# Patient Record
Sex: FEMALE | Race: BLACK OR AFRICAN AMERICAN | NOT HISPANIC OR LATINO | Employment: FULL TIME | URBAN - METROPOLITAN AREA
[De-identification: names, ages, dates, MRNs, and addresses within clinical notes are randomized per-mention and may not be internally consistent; named-entity substitution may affect disease eponyms.]

---

## 2018-08-12 ENCOUNTER — HOSPITAL ENCOUNTER (EMERGENCY)
Facility: HOSPITAL | Age: 53
Discharge: HOME | End: 2018-08-12
Attending: EMERGENCY MEDICINE
Payer: MEDICAID

## 2018-08-12 VITALS
RESPIRATION RATE: 18 BRPM | BODY MASS INDEX: 37.21 KG/M2 | TEMPERATURE: 98 F | HEIGHT: 63 IN | DIASTOLIC BLOOD PRESSURE: 84 MMHG | WEIGHT: 210 LBS | OXYGEN SATURATION: 96 % | SYSTOLIC BLOOD PRESSURE: 177 MMHG | HEART RATE: 88 BPM

## 2018-08-12 DIAGNOSIS — R51.9 FRONTAL HEADACHE: Primary | ICD-10-CM

## 2018-08-12 DIAGNOSIS — I10 ESSENTIAL HYPERTENSION: ICD-10-CM

## 2018-08-12 PROCEDURE — 25000003 PHARM REV CODE 250: Performed by: NURSE PRACTITIONER

## 2018-08-12 PROCEDURE — 99284 EMERGENCY DEPT VISIT MOD MDM: CPT

## 2018-08-12 RX ORDER — ONDANSETRON 8 MG/1
8 TABLET, ORALLY DISINTEGRATING ORAL
Status: COMPLETED | OUTPATIENT
Start: 2018-08-12 | End: 2018-08-12

## 2018-08-12 RX ORDER — HYDROCODONE BITARTRATE AND ACETAMINOPHEN 10; 325 MG/1; MG/1
1 TABLET ORAL
Status: COMPLETED | OUTPATIENT
Start: 2018-08-12 | End: 2018-08-12

## 2018-08-12 RX ORDER — AMLODIPINE BESYLATE 10 MG/1
10 TABLET ORAL DAILY
Qty: 30 TABLET | Refills: 0 | Status: SHIPPED | OUTPATIENT
Start: 2018-08-12 | End: 2020-03-16 | Stop reason: ALTCHOICE

## 2018-08-12 RX ADMIN — HYDROCODONE BITARTRATE AND ACETAMINOPHEN 1 TABLET: 10; 325 TABLET ORAL at 01:08

## 2018-08-12 RX ADMIN — ONDANSETRON 8 MG: 8 TABLET, ORALLY DISINTEGRATING ORAL at 01:08

## 2018-08-12 NOTE — ED PROVIDER NOTES
Encounter Date: 8/12/2018       History     Chief Complaint   Patient presents with    Hypertension     reports reading in 190's on friday. reports not on meds     53 year old female with complaint of headache and elevated BP over the past few weeks.  Reports elevated BP last week also.  No neck pain. Reports that she used to take BP medications 3 years ago but stopped taking mediation. Reports that she had a severe headache yesterday.  Denies history of headaches.           Review of patient's allergies indicates:  No Known Allergies  Past Medical History:   Diagnosis Date    Hypertension      Past Surgical History:   Procedure Laterality Date    CHOLECYSTECTOMY      HERNIA REPAIR       History reviewed. No pertinent family history.  Social History     Tobacco Use    Smoking status: Current Every Day Smoker    Smokeless tobacco: Never Used   Substance Use Topics    Alcohol use: No     Frequency: Never    Drug use: Not on file     Review of Systems   Constitutional: Negative for fever.   HENT: Negative for sore throat.    Respiratory: Negative for shortness of breath.    Cardiovascular: Negative for chest pain.   Gastrointestinal: Negative for nausea.   Genitourinary: Negative for dysuria.   Musculoskeletal: Negative for back pain.   Skin: Negative for rash.   Neurological: Positive for headaches. Negative for weakness.   Hematological: Does not bruise/bleed easily.       Physical Exam     Initial Vitals [08/12/18 1323]   BP Pulse Resp Temp SpO2   (!) 177/84 88 18 98.2 °F (36.8 °C) 96 %      MAP       --         Physical Exam    Nursing note and vitals reviewed.  Constitutional: She appears well-developed and well-nourished.   HENT:   Head: Normocephalic and atraumatic.   Eyes: Conjunctivae and EOM are normal. Pupils are equal, round, and reactive to light.   Neck: Normal range of motion. Neck supple.   Cardiovascular: Normal rate, regular rhythm, normal heart sounds and intact distal pulses.    Pulmonary/Chest: Breath sounds normal.   Abdominal: Soft. There is no tenderness. There is no rebound and no guarding.   Musculoskeletal: Normal range of motion.   Neurological: She is alert and oriented to person, place, and time. She has normal strength and normal reflexes.   Skin: Skin is warm and dry.   Psychiatric: She has a normal mood and affect. Her behavior is normal. Thought content normal.         ED Course   Procedures  Labs Reviewed - No data to display       Imaging Results          CT Head Without Contrast (Final result)  Result time 08/12/18 14:56:09    Final result by Barrie Padron MD (08/12/18 14:56:09)                 Impression:      Negative    All CT scans at this facility use dose modulation, iterative reconstruction, and/or weight based dosing when appropriate to reduce radiation dose to as low as reasonably achievable.      Electronically signed by: Barrie Padron MD  Date:    08/12/2018  Time:    14:56             Narrative:    EXAMINATION:  CT HEAD WITHOUT CONTRAST    CLINICAL HISTORY:  headache;    TECHNIQUE:  Standard non contrast CT scan of the brain.    COMPARISON:  None    FINDINGS:  The ventricles are nondilated. Gray and white matter structures reveal normal attenuation. No hemorrhage, mass effect or edema is identified.    The cranium is intact.  Visualized paranasal sinuses and mastoid air cells are clear.                                  Imaging Results          CT Head Without Contrast (Final result)  Result time 08/12/18 14:56:09    Final result by Barrie Padron MD (08/12/18 14:56:09)                 Impression:      Negative    All CT scans at this facility use dose modulation, iterative reconstruction, and/or weight based dosing when appropriate to reduce radiation dose to as low as reasonably achievable.      Electronically signed by: Barrie Padron MD  Date:    08/12/2018  Time:    14:56             Narrative:    EXAMINATION:  CT HEAD WITHOUT CONTRAST    CLINICAL  HISTORY:  headache;    TECHNIQUE:  Standard non contrast CT scan of the brain.    COMPARISON:  None    FINDINGS:  The ventricles are nondilated. Gray and white matter structures reveal normal attenuation. No hemorrhage, mass effect or edema is identified.    The cranium is intact.  Visualized paranasal sinuses and mastoid air cells are clear.                                                     Clinical Impression:   The primary encounter diagnosis was Frontal headache. A diagnosis of Essential hypertension was also pertinent to this visit.                             Toby Varela NP  08/12/18 1516       Toby Varela NP  08/12/18 1516

## 2018-09-07 LAB
BCS RECOMMENDATION EXT: NORMAL
CHOLEST SERPL-MSCNC: 247 MG/DL (ref 0–200)
HDLC SERPL-MCNC: 69 MG/DL (ref 35–70)
LDLC SERPL CALC-MCNC: 159 MG/DL (ref 0–160)
TRIGL SERPL-MCNC: 97 MG/DL (ref 40–160)

## 2018-11-10 ENCOUNTER — HOSPITAL ENCOUNTER (EMERGENCY)
Facility: HOSPITAL | Age: 53
Discharge: HOME | End: 2018-11-10
Attending: EMERGENCY MEDICINE
Payer: MEDICAID

## 2018-11-10 VITALS
RESPIRATION RATE: 20 BRPM | TEMPERATURE: 99 F | DIASTOLIC BLOOD PRESSURE: 76 MMHG | HEART RATE: 66 BPM | OXYGEN SATURATION: 100 % | SYSTOLIC BLOOD PRESSURE: 151 MMHG
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
## 2018-11-10 DIAGNOSIS — I10 HYPERTENSION, UNSPECIFIED TYPE: ICD-10-CM

## 2018-11-10 DIAGNOSIS — F17.200 SMOKER: ICD-10-CM

## 2018-11-10 DIAGNOSIS — S00.03XA CONTUSION OF SCALP, INITIAL ENCOUNTER: Primary | ICD-10-CM

## 2018-11-10 PROCEDURE — 63600175 PHARM REV CODE 636 W HCPCS: Performed by: PHYSICIAN ASSISTANT

## 2018-11-10 PROCEDURE — 99284 EMERGENCY DEPT VISIT MOD MDM: CPT | Mod: 25

## 2018-11-10 PROCEDURE — 96372 THER/PROPH/DIAG INJ SC/IM: CPT

## 2018-11-10 RX ORDER — MORPHINE SULFATE 4 MG/ML
4 INJECTION, SOLUTION INTRAMUSCULAR; INTRAVENOUS
Status: COMPLETED | OUTPATIENT
Start: 2018-11-10 | End: 2018-11-10

## 2018-11-10 RX ORDER — HYDROCODONE BITARTRATE AND ACETAMINOPHEN 5; 325 MG/1; MG/1
1 TABLET ORAL EVERY 4 HOURS PRN
Qty: 12 TABLET | Refills: 0 | Status: SHIPPED | OUTPATIENT
Start: 2018-11-10 | End: 2020-03-16

## 2018-11-10 RX ORDER — PROMETHAZINE HYDROCHLORIDE 25 MG/ML
25 INJECTION, SOLUTION INTRAMUSCULAR; INTRAVENOUS
Status: COMPLETED | OUTPATIENT
Start: 2018-11-10 | End: 2018-11-10

## 2018-11-10 RX ADMIN — PROMETHAZINE HYDROCHLORIDE 25 MG: 25 INJECTION INTRAMUSCULAR; INTRAVENOUS at 11:11

## 2018-11-10 RX ADMIN — MORPHINE SULFATE 4 MG: 4 INJECTION INTRAVENOUS at 11:11

## 2018-11-10 NOTE — ED PROVIDER NOTES
Encounter Date: 11/10/2018       History     Chief Complaint   Patient presents with    Fall     fell off chair and hit head     Pt fell from chair and struck her head on a tiled floor.      The history is provided by the patient.   Head Injury    The incident occurred just prior to arrival. She came to the ER via walk-in. The injury mechanism was a fall. There was no loss of consciousness. There was no blood loss. The quality of the pain is described as throbbing. The pain is at a severity of 6/10. The pain has been constant since the injury. Pertinent negatives include no numbness, no blurred vision, no vomiting, no tinnitus, no disorientation, no weakness and no memory loss.     Review of patient's allergies indicates:  No Known Allergies  Past Medical History:   Diagnosis Date    Hypertension      Past Surgical History:   Procedure Laterality Date    CHOLECYSTECTOMY      HERNIA REPAIR       No family history on file.  Social History     Tobacco Use    Smoking status: Current Every Day Smoker    Smokeless tobacco: Never Used   Substance Use Topics    Alcohol use: No     Frequency: Never    Drug use: Not on file     Review of Systems   Constitutional: Negative for chills and fever.   HENT: Negative for sore throat and tinnitus.    Eyes: Negative for blurred vision, pain and redness.   Respiratory: Negative for cough and shortness of breath.    Cardiovascular: Negative for chest pain.   Gastrointestinal: Negative for abdominal pain, diarrhea, nausea and vomiting.   Endocrine: Negative for polydipsia and polyphagia.   Genitourinary: Negative for dysuria.   Musculoskeletal: Negative for arthralgias, back pain and myalgias.   Skin: Negative for rash.   Neurological: Positive for headaches. Negative for weakness and numbness.   Hematological: Does not bruise/bleed easily.   Psychiatric/Behavioral: Negative for memory loss. The patient is not nervous/anxious.    All other systems reviewed and are  negative.      Physical Exam     Initial Vitals [11/10/18 0947]   BP Pulse Resp Temp SpO2   (!) 204/87 80 (!) 22 98.5 °F (36.9 °C) 99 %      MAP       --         Physical Exam    Nursing note and vitals reviewed.  Constitutional: Vital signs are normal. She appears well-developed and well-nourished. No distress.   HENT:   Head: Normocephalic and atraumatic.   Right Ear: External ear normal.   Left Ear: External ear normal.   Nose: Nose normal.   Mouth/Throat: Oropharynx is clear and moist.   Eyes: Conjunctivae, EOM and lids are normal. Pupils are equal, round, and reactive to light.   Neck: Normal range of motion and full passive range of motion without pain. Neck supple.   Cardiovascular: Normal rate, regular rhythm, S1 normal, S2 normal, normal heart sounds, intact distal pulses and normal pulses.   Pulmonary/Chest: Breath sounds normal. No respiratory distress. She has no wheezes. She has no rales.   Abdominal: Soft. Normal appearance and bowel sounds are normal. She exhibits no distension. There is no tenderness.   Musculoskeletal: Normal range of motion.   Lymphadenopathy:     She has no cervical adenopathy.   Neurological: She is alert and oriented to person, place, and time. She has normal strength. No cranial nerve deficit or sensory deficit. Coordination and gait normal.   Skin: Skin is warm, dry and intact.   Psychiatric: She has a normal mood and affect. Her speech is normal and behavior is normal. Judgment and thought content normal. Cognition and memory are normal.         ED Course   Procedures  Labs Reviewed - No data to display       Imaging Results          CT Head Without Contrast (Final result)  Result time 11/10/18 10:27:13    Final result by Abdulkadir Lyle MD (Timothy) (11/10/18 10:27:13)                 Impression:      Normal study.    All CT scans at this facility use dose modulation, iterative reconstructions, and/or weight base dosing when appropriate to reduce radiation dose to as low as  reasonably achievable.      Electronically signed by: Abdulkadir Lyle MD  Date:    11/10/2018  Time:    10:27             Narrative:    EXAMINATION:  CT HEAD WITHOUT CONTRAST    CLINICAL HISTORY:  Head trauma, minor, GCS>=13, NOC/NEXUS/CCR neg, first study;Head trauma, headache;    COMPARISON:  08/12/2018.    FINDINGS:  Motion artifact.  No intracranial acute hemorrhage or acute focal brain parenchymal abnormality is identified.  Calvarium is intact.  No change.                                    Additional MDM:   Hypertension: The patient has hypertension (no treatment required at this time). The patient's condition was felt to be stable.             Pre-hypertension/Hypertension: The pt has been informed that they may have pre-hypertension or hypertension based on a blood pressure reading in the ED. I recommend that the pt call the PCP listed on their discharge instructions or a physician of their choice this week to arrange f/u for further evaluation of possible pre-hypertension or hypertension.           Clinical Impression:   The primary encounter diagnosis was Contusion of scalp, initial encounter. Diagnoses of Hypertension, unspecified type, Fall, initial encounter, and Smoker were also pertinent to this visit.      Disposition:   Disposition: Discharged  Condition: Stable                        [facility]  11/10/18 1053       [facility]  11/10/18 1054

## 2019-01-23 LAB
HEP A AB,TOTAL: NEGATIVE
HEP B SURFACE AG: NEGATIVE
HEP C VIRUS AB: NEGATIVE
HEPATITIS B CORE IGM ANTIBODY: NEGATIVE

## 2020-03-15 PROBLEM — I10 ESSENTIAL HYPERTENSION: Status: ACTIVE | Noted: 2017-09-12

## 2020-03-15 PROBLEM — E66.9 OBESITY: Status: ACTIVE | Noted: 2020-03-15

## 2020-03-15 PROBLEM — F14.11 HISTORY OF COCAINE ABUSE: Status: ACTIVE | Noted: 2019-03-26

## 2020-03-15 PROBLEM — E78.5 DYSLIPIDEMIA: Status: ACTIVE | Noted: 2020-03-15

## 2020-03-16 PROBLEM — E66.9 OBESITY: Status: RESOLVED | Noted: 2020-03-15 | Resolved: 2020-03-16

## 2020-07-09 PROBLEM — Z86.010 HISTORY OF ADENOMATOUS POLYP OF COLON: Status: ACTIVE | Noted: 2020-07-09

## 2020-07-09 PROBLEM — Z86.010 HISTORY OF COLON POLYPS: Status: ACTIVE | Noted: 2020-07-09

## 2021-04-28 DIAGNOSIS — Z12.31 OTHER SCREENING MAMMOGRAM: ICD-10-CM

## 2021-05-12 ENCOUNTER — PATIENT MESSAGE (OUTPATIENT)
Dept: RESEARCH | Facility: HOSPITAL | Age: 56
End: 2021-05-12

## 2021-08-11 ENCOUNTER — PATIENT OUTREACH (OUTPATIENT)
Dept: ADMINISTRATIVE | Facility: HOSPITAL | Age: 56
End: 2021-08-11

## 2021-12-08 ENCOUNTER — PATIENT OUTREACH (OUTPATIENT)
Dept: ADMINISTRATIVE | Facility: HOSPITAL | Age: 56
End: 2021-12-08
Payer: MEDICAID

## 2022-06-30 LAB — CRC RECOMMENDATION EXT: NORMAL

## 2022-07-17 PROBLEM — M25.561 CHRONIC PAIN OF RIGHT KNEE: Status: ACTIVE | Noted: 2022-07-17

## 2022-07-17 PROBLEM — M19.90 ARTHRITIS: Status: ACTIVE | Noted: 2022-07-17

## 2022-07-17 PROBLEM — G89.29 CHRONIC PAIN OF RIGHT KNEE: Status: ACTIVE | Noted: 2022-07-17

## 2022-08-23 ENCOUNTER — PATIENT OUTREACH (OUTPATIENT)
Dept: ADMINISTRATIVE | Facility: HOSPITAL | Age: 57
End: 2022-08-23
Payer: MEDICAID

## 2022-08-23 NOTE — PROGRESS NOTES
There are no preventive care reminders to display for this patient.   Contacted pt regarding BP. Pt doesn't monitor BP at home but are interested in signing up with Hypertension [facility]. Message sent to doctor and note placed on up coming appt.

## 2022-12-06 PROBLEM — M17.11 PRIMARY OSTEOARTHRITIS OF RIGHT KNEE: Status: ACTIVE | Noted: 2022-12-06

## 2022-12-17 PROBLEM — I26.99 ACUTE PULMONARY EMBOLISM: Status: ACTIVE | Noted: 2022-12-17

## 2022-12-17 PROBLEM — I50.9 ACUTE HF (HEART FAILURE): Status: ACTIVE | Noted: 2022-12-17

## 2022-12-17 PROBLEM — R06.09 EXERTIONAL DYSPNEA: Status: ACTIVE | Noted: 2022-12-17

## 2022-12-17 PROBLEM — R79.89 ELEVATED TROPONIN LEVEL: Status: ACTIVE | Noted: 2022-12-17

## 2022-12-19 PROBLEM — I50.20 HFREF (HEART FAILURE WITH REDUCED EJECTION FRACTION): Status: ACTIVE | Noted: 2022-12-19

## 2022-12-19 PROBLEM — R94.39 ABNORMAL FINDING ON CARDIOVASCULAR STRESS TEST: Status: ACTIVE | Noted: 2022-12-19

## 2022-12-20 PROBLEM — R78.81 POSITIVE BLOOD CULTURES: Status: ACTIVE | Noted: 2022-12-20

## 2022-12-22 PROBLEM — R78.81 POSITIVE BLOOD CULTURES: Status: RESOLVED | Noted: 2022-12-20 | Resolved: 2022-12-22

## 2022-12-22 PROBLEM — I26.99 ACUTE PULMONARY EMBOLISM: Status: RESOLVED | Noted: 2022-12-17 | Resolved: 2022-12-22

## 2022-12-23 ENCOUNTER — PATIENT OUTREACH (OUTPATIENT)
Dept: ADMINISTRATIVE | Facility: CLINIC | Age: 57
End: 2022-12-23

## 2022-12-23 NOTE — PROGRESS NOTES
C3 nurse spoke with Luli Rogers  for a TCC post hospital discharge follow up call. The patient has a scheduled HOSFU appointment with Guicho Rice MD  on 01/03/2023 @ 8AM.

## 2023-01-23 PROBLEM — Z86.19 HISTORY OF SYPHILIS: Status: ACTIVE | Noted: 2023-01-23

## 2023-02-10 ENCOUNTER — PATIENT OUTREACH (OUTPATIENT)
Dept: ADMINISTRATIVE | Facility: OTHER | Age: 58
End: 2023-02-10

## 2023-02-10 NOTE — PROGRESS NOTES
CHW - Initial Contact    This Community Health Worker completed the Social Determinant of Health questionnaire with patient via telephone today.      Pt identified barriers of most importance are: Home Health Pt stated she wants home health services     Referrals to community agencies completed with patient/caregiver consent outside of [facility] include: none    Referrals were put through [facility] - none    Support and Services: CHW sent in-basket message to Pt's PCP  about Pt's request for home health    Other information discussed the patient needs / wants help with: none    Follow up required: yes    Follow-up Outreach - Due: 2/17/2023

## 2023-02-22 ENCOUNTER — PATIENT OUTREACH (OUTPATIENT)
Dept: ADMINISTRATIVE | Facility: OTHER | Age: 58
End: 2023-02-22

## 2023-02-22 NOTE — PROGRESS NOTES
CHW - Case Closure    This Community Health Worker spoke to patient via telephone today.     Pt/Caregiver reported: CHW sent a hh in basket message to patient's pcp staff. Same day a LNP called the patient about hh but due to her insurance change from [employer] to [employer] PP has to wait for home health until March 1 2023. Patient doesn't need any community resources at this time.    Pt/Caregiver denied any additional needs at this time and agrees with episode closure at this time.  Provided patient with Community Health Worker's contact information and encouraged him/her to contact this Community Health Worker if additional needs arise.

## 2023-03-10 PROBLEM — I50.23 ACUTE ON CHRONIC SYSTOLIC CONGESTIVE HEART FAILURE: Status: ACTIVE | Noted: 2023-03-10

## 2023-03-10 PROBLEM — I21.4 NSTEMI (NON-ST ELEVATED MYOCARDIAL INFARCTION): Status: ACTIVE | Noted: 2023-03-10

## 2023-03-11 PROBLEM — I50.23 ACUTE ON CHRONIC SYSTOLIC CONGESTIVE HEART FAILURE: Status: RESOLVED | Noted: 2023-03-10 | Resolved: 2023-03-11

## 2023-03-11 PROBLEM — I21.4 NSTEMI (NON-ST ELEVATED MYOCARDIAL INFARCTION): Status: RESOLVED | Noted: 2023-03-10 | Resolved: 2023-03-11

## 2023-03-11 PROBLEM — R06.02 SOB (SHORTNESS OF BREATH): Status: ACTIVE | Noted: 2022-12-17

## 2023-03-13 PROBLEM — I50.9 ACUTE HF (HEART FAILURE): Status: RESOLVED | Noted: 2022-12-17 | Resolved: 2023-03-13

## 2023-04-23 PROBLEM — E11.9 NEWLY DIAGNOSED DIABETES: Status: ACTIVE | Noted: 2023-04-23

## 2023-04-23 PROBLEM — R06.02 SHORTNESS OF BREATH: Status: ACTIVE | Noted: 2023-04-23

## 2023-04-23 PROBLEM — J96.01 ACUTE RESPIRATORY FAILURE WITH HYPOXIA AND HYPERCAPNIA: Status: ACTIVE | Noted: 2023-04-23

## 2023-04-23 PROBLEM — J96.02 ACUTE RESPIRATORY FAILURE WITH HYPOXIA AND HYPERCAPNIA: Status: ACTIVE | Noted: 2023-04-23

## 2023-05-05 ENCOUNTER — PATIENT MESSAGE (OUTPATIENT)
Dept: ADMINISTRATIVE | Facility: HOSPITAL | Age: 58
End: 2023-05-05

## 2023-06-05 PROBLEM — E11.65 TYPE 2 DIABETES MELLITUS WITH HYPERGLYCEMIA, WITHOUT LONG-TERM CURRENT USE OF INSULIN: Status: ACTIVE | Noted: 2023-06-05

## 2023-06-05 PROBLEM — I50.23 ACUTE ON CHRONIC SYSTOLIC CHF (CONGESTIVE HEART FAILURE): Status: RESOLVED | Noted: 2023-03-10 | Resolved: 2023-06-05

## 2023-06-07 PROBLEM — E11.9 NEWLY DIAGNOSED DIABETES: Status: RESOLVED | Noted: 2023-04-23 | Resolved: 2023-06-07

## 2023-07-14 PROBLEM — R10.11 RUQ ABDOMINAL PAIN: Status: ACTIVE | Noted: 2023-07-14

## 2023-07-14 PROBLEM — I50.9 ACUTE ON CHRONIC HEART FAILURE: Status: ACTIVE | Noted: 2023-07-14

## 2023-07-15 PROBLEM — R10.11 RUQ ABDOMINAL PAIN: Status: RESOLVED | Noted: 2023-07-14 | Resolved: 2023-07-15

## 2023-07-15 PROBLEM — Z91.199 MEDICALLY NONCOMPLIANT: Status: ACTIVE | Noted: 2023-07-15

## 2023-07-15 PROBLEM — I50.23 ACUTE ON CHRONIC SYSTOLIC HEART FAILURE: Status: ACTIVE | Noted: 2023-07-14

## 2023-07-24 PROBLEM — J96.02 ACUTE RESPIRATORY FAILURE WITH HYPOXIA AND HYPERCAPNIA: Status: RESOLVED | Noted: 2023-04-23 | Resolved: 2023-07-24

## 2023-07-24 PROBLEM — J96.01 ACUTE RESPIRATORY FAILURE WITH HYPOXIA AND HYPERCAPNIA: Status: RESOLVED | Noted: 2023-04-23 | Resolved: 2023-07-24

## 2023-07-29 PROBLEM — I50.43 ACUTE ON CHRONIC COMBINED SYSTOLIC AND DIASTOLIC CONGESTIVE HEART FAILURE: Status: ACTIVE | Noted: 2023-07-14

## 2023-10-02 DIAGNOSIS — Z12.31 OTHER SCREENING MAMMOGRAM: ICD-10-CM

## 2023-10-04 PROBLEM — I50.42 CHRONIC COMBINED SYSTOLIC AND DIASTOLIC CONGESTIVE HEART FAILURE: Status: ACTIVE | Noted: 2023-10-04

## 2023-10-09 ENCOUNTER — PATIENT MESSAGE (OUTPATIENT)
Dept: ADMINISTRATIVE | Facility: HOSPITAL | Age: 58
End: 2023-10-09